# Patient Record
Sex: FEMALE | Race: BLACK OR AFRICAN AMERICAN | Employment: OTHER | URBAN - METROPOLITAN AREA
[De-identification: names, ages, dates, MRNs, and addresses within clinical notes are randomized per-mention and may not be internally consistent; named-entity substitution may affect disease eponyms.]

---

## 2017-02-02 ENCOUNTER — OFFICE VISIT (OUTPATIENT)
Dept: PRIMARY CARE CLINIC | Age: 72
End: 2017-02-02

## 2017-02-02 VITALS
TEMPERATURE: 97.2 F | BODY MASS INDEX: 26.07 KG/M2 | DIASTOLIC BLOOD PRESSURE: 78 MMHG | WEIGHT: 164 LBS | OXYGEN SATURATION: 100 % | HEART RATE: 73 BPM | SYSTOLIC BLOOD PRESSURE: 110 MMHG

## 2017-02-02 DIAGNOSIS — E55.9 VITAMIN D DEFICIENCY: ICD-10-CM

## 2017-02-02 DIAGNOSIS — K30 INDIGESTION: ICD-10-CM

## 2017-02-02 DIAGNOSIS — E78.2 MIXED HYPERLIPIDEMIA: ICD-10-CM

## 2017-02-02 DIAGNOSIS — Z12.11 COLON CANCER SCREENING: ICD-10-CM

## 2017-02-02 DIAGNOSIS — I10 ESSENTIAL HYPERTENSION: Primary | ICD-10-CM

## 2017-02-02 PROCEDURE — 3288F FALL RISK ASSESSMENT DOCD: CPT | Performed by: INTERNAL MEDICINE

## 2017-02-02 PROCEDURE — 99213 OFFICE O/P EST LOW 20 MIN: CPT | Performed by: INTERNAL MEDICINE

## 2017-02-02 PROCEDURE — G8510 SCR DEP NEG, NO PLAN REQD: HCPCS | Performed by: INTERNAL MEDICINE

## 2017-02-02 RX ORDER — RANITIDINE 150 MG/1
150 TABLET ORAL 2 TIMES DAILY
Qty: 60 TABLET | Refills: 3 | Status: SHIPPED | OUTPATIENT
Start: 2017-02-02 | End: 2017-08-07 | Stop reason: ALTCHOICE

## 2017-02-05 ASSESSMENT — ENCOUNTER SYMPTOMS
CHEST TIGHTNESS: 0
NAUSEA: 0
ABDOMINAL PAIN: 0
STRIDOR: 0
SORE THROAT: 0
BLOOD IN STOOL: 0
VOICE CHANGE: 0
BACK PAIN: 0
ABDOMINAL DISTENTION: 0
RECTAL PAIN: 0
CHOKING: 0
RHINORRHEA: 0
ANAL BLEEDING: 0
SINUS PRESSURE: 0
SHORTNESS OF BREATH: 0
VOMITING: 0
APNEA: 0
CONSTIPATION: 0
WHEEZING: 0
TROUBLE SWALLOWING: 0
DIARRHEA: 0
FACIAL SWELLING: 0
EYES NEGATIVE: 1
COUGH: 0

## 2017-02-05 ASSESSMENT — PATIENT HEALTH QUESTIONNAIRE - PHQ9
1. LITTLE INTEREST OR PLEASURE IN DOING THINGS: 0
SUM OF ALL RESPONSES TO PHQ QUESTIONS 1-9: 0
2. FEELING DOWN, DEPRESSED OR HOPELESS: 0
SUM OF ALL RESPONSES TO PHQ9 QUESTIONS 1 & 2: 0

## 2017-02-10 ENCOUNTER — HOSPITAL ENCOUNTER (OUTPATIENT)
Dept: OTHER | Age: 72
Discharge: HOME | End: 2017-02-10
Attending: INTERNAL MEDICINE | Admitting: INTERNAL MEDICINE

## 2017-02-10 LAB
ALBUMIN SERPL-MCNC: 4.2 G/DL (ref 3.4–5)
ANION GAP SERPL CALCULATED.3IONS-SCNC: 13 MMOL/L (ref 3–16)
BUN BLDV-MCNC: 12 MG/DL (ref 7–20)
CALCIUM SERPL-MCNC: 9.6 MG/DL (ref 8.3–10.6)
CHLORIDE BLD-SCNC: 100 MMOL/L (ref 99–110)
CHOLESTEROL, TOTAL: 243 MG/DL (ref 0–199)
CO2: 27 MMOL/L (ref 21–32)
CREAT SERPL-MCNC: 0.7 MG/DL (ref 0.6–1.2)
GFR AFRICAN AMERICAN: >60
GFR NON-AFRICAN AMERICAN: >60
GLUCOSE BLD-MCNC: 84 MG/DL (ref 70–99)
HDLC SERPL-MCNC: 83 MG/DL (ref 40–60)
LDL CHOLESTEROL CALCULATED: 152 MG/DL
PHOSPHORUS: 3.3 MG/DL (ref 2.5–4.9)
POTASSIUM SERPL-SCNC: 4.3 MMOL/L (ref 3.5–5.1)
SODIUM BLD-SCNC: 140 MMOL/L (ref 136–145)
TRIGL SERPL-MCNC: 42 MG/DL (ref 0–150)
VITAMIN D 25-HYDROXY: 30.5 NG/ML
VLDLC SERPL CALC-MCNC: 8 MG/DL

## 2017-02-12 LAB — H PYLORI BREATH TEST: NEGATIVE

## 2017-02-28 ENCOUNTER — TELEPHONE (OUTPATIENT)
Dept: FAMILY MEDICINE CLINIC | Age: 72
End: 2017-02-28

## 2017-08-07 ENCOUNTER — OFFICE VISIT (OUTPATIENT)
Dept: PRIMARY CARE CLINIC | Age: 72
End: 2017-08-07

## 2017-08-07 VITALS
SYSTOLIC BLOOD PRESSURE: 118 MMHG | WEIGHT: 161 LBS | DIASTOLIC BLOOD PRESSURE: 84 MMHG | HEART RATE: 56 BPM | BODY MASS INDEX: 25.88 KG/M2 | OXYGEN SATURATION: 100 % | TEMPERATURE: 98.6 F | HEIGHT: 66 IN

## 2017-08-07 DIAGNOSIS — I10 ESSENTIAL HYPERTENSION: ICD-10-CM

## 2017-08-07 DIAGNOSIS — Z11.59 NEED FOR HEPATITIS C SCREENING TEST: ICD-10-CM

## 2017-08-07 DIAGNOSIS — Z23 NEED FOR PROPHYLACTIC VACCINATION AGAINST STREPTOCOCCUS PNEUMONIAE (PNEUMOCOCCUS): ICD-10-CM

## 2017-08-07 DIAGNOSIS — Z23 NEED FOR PROPHYLACTIC VACCINATION AGAINST DIPHTHERIA-TETANUS-PERTUSSIS (DTP): ICD-10-CM

## 2017-08-07 DIAGNOSIS — N39.0 URINARY TRACT INFECTION WITHOUT HEMATURIA, SITE UNSPECIFIED: Primary | ICD-10-CM

## 2017-08-07 LAB
A/G RATIO: 1.6 (ref 1.1–2.2)
ALBUMIN SERPL-MCNC: 4.9 G/DL (ref 3.4–5)
ALP BLD-CCNC: 145 U/L (ref 40–129)
ALT SERPL-CCNC: 12 U/L (ref 10–40)
ANION GAP SERPL CALCULATED.3IONS-SCNC: 14 MMOL/L (ref 3–16)
AST SERPL-CCNC: 17 U/L (ref 15–37)
BASOPHILS ABSOLUTE: 0 K/UL (ref 0–0.2)
BASOPHILS RELATIVE PERCENT: 0.9 %
BILIRUB SERPL-MCNC: 0.5 MG/DL (ref 0–1)
BILIRUBIN URINE: NEGATIVE
BLOOD, URINE: NEGATIVE
BUN BLDV-MCNC: 10 MG/DL (ref 7–20)
CALCIUM SERPL-MCNC: 10.3 MG/DL (ref 8.3–10.6)
CHLORIDE BLD-SCNC: 100 MMOL/L (ref 99–110)
CLARITY: CLEAR
CO2: 28 MMOL/L (ref 21–32)
COLOR: YELLOW
CREAT SERPL-MCNC: 0.6 MG/DL (ref 0.6–1.2)
EOSINOPHILS ABSOLUTE: 0 K/UL (ref 0–0.6)
EOSINOPHILS RELATIVE PERCENT: 0.5 %
EPITHELIAL CELLS, UA: 2 /HPF (ref 0–5)
GFR AFRICAN AMERICAN: >60
GFR NON-AFRICAN AMERICAN: >60
GLOBULIN: 3 G/DL
GLUCOSE BLD-MCNC: 96 MG/DL (ref 70–99)
GLUCOSE URINE: NEGATIVE MG/DL
HCT VFR BLD CALC: 43.8 % (ref 36–48)
HEMOGLOBIN: 14.1 G/DL (ref 12–16)
HYALINE CASTS: 1 /LPF (ref 0–8)
KETONES, URINE: ABNORMAL MG/DL
LEUKOCYTE ESTERASE, URINE: ABNORMAL
LYMPHOCYTES ABSOLUTE: 1.3 K/UL (ref 1–5.1)
LYMPHOCYTES RELATIVE PERCENT: 42.1 %
MCH RBC QN AUTO: 28.7 PG (ref 26–34)
MCHC RBC AUTO-ENTMCNC: 32.2 G/DL (ref 31–36)
MCV RBC AUTO: 89.1 FL (ref 80–100)
MICROSCOPIC EXAMINATION: YES
MONOCYTES ABSOLUTE: 0.4 K/UL (ref 0–1.3)
MONOCYTES RELATIVE PERCENT: 12 %
NEUTROPHILS ABSOLUTE: 1.4 K/UL (ref 1.7–7.7)
NEUTROPHILS RELATIVE PERCENT: 44.5 %
NITRITE, URINE: NEGATIVE
PDW BLD-RTO: 13.4 % (ref 12.4–15.4)
PH UA: 6
PLATELET # BLD: 197 K/UL (ref 135–450)
PMV BLD AUTO: 8.7 FL (ref 5–10.5)
POTASSIUM SERPL-SCNC: 5.4 MMOL/L (ref 3.5–5.1)
PROTEIN UA: NEGATIVE MG/DL
RBC # BLD: 4.91 M/UL (ref 4–5.2)
RBC UA: 4 /HPF (ref 0–4)
SODIUM BLD-SCNC: 142 MMOL/L (ref 136–145)
SPECIFIC GRAVITY UA: 1.01
TOTAL PROTEIN: 7.9 G/DL (ref 6.4–8.2)
URINE TYPE: ABNORMAL
UROBILINOGEN, URINE: 0.2 E.U./DL
WBC # BLD: 3.1 K/UL (ref 4–11)
WBC UA: 9 /HPF (ref 0–5)

## 2017-08-07 PROCEDURE — 99213 OFFICE O/P EST LOW 20 MIN: CPT | Performed by: INTERNAL MEDICINE

## 2017-08-07 RX ORDER — IRBESARTAN AND HYDROCHLOROTHIAZIDE 300; 12.5 MG/1; MG/1
1 TABLET, FILM COATED ORAL DAILY
Qty: 30 TABLET | Refills: 3 | Status: SHIPPED | OUTPATIENT
Start: 2017-08-07 | End: 2017-11-08

## 2017-08-07 RX ORDER — SULFAMETHOXAZOLE AND TRIMETHOPRIM 800; 160 MG/1; MG/1
1 TABLET ORAL DAILY
Qty: 7 TABLET | Refills: 0 | Status: SHIPPED | OUTPATIENT
Start: 2017-08-07 | End: 2017-08-14

## 2017-08-07 ASSESSMENT — PATIENT HEALTH QUESTIONNAIRE - PHQ9
SUM OF ALL RESPONSES TO PHQ QUESTIONS 1-9: 0
SUM OF ALL RESPONSES TO PHQ9 QUESTIONS 1 & 2: 0
1. LITTLE INTEREST OR PLEASURE IN DOING THINGS: 0
2. FEELING DOWN, DEPRESSED OR HOPELESS: 0

## 2017-08-08 ENCOUNTER — TELEPHONE (OUTPATIENT)
Dept: PRIMARY CARE CLINIC | Age: 72
End: 2017-08-08

## 2017-08-08 LAB — HEPATITIS C ANTIBODY INTERPRETATION: NORMAL

## 2017-08-09 ASSESSMENT — ENCOUNTER SYMPTOMS
SHORTNESS OF BREATH: 0
ANAL BLEEDING: 0
COUGH: 0
APNEA: 0
RHINORRHEA: 0
DIARRHEA: 0
ABDOMINAL PAIN: 0
STRIDOR: 0
VOMITING: 0
ABDOMINAL DISTENTION: 0
CONSTIPATION: 0
BLOOD IN STOOL: 0
RECTAL PAIN: 0
SORE THROAT: 0
CHOKING: 0
EYES NEGATIVE: 1
NAUSEA: 0
WHEEZING: 0
TROUBLE SWALLOWING: 0
SINUS PRESSURE: 0
CHEST TIGHTNESS: 0
VOICE CHANGE: 0
FACIAL SWELLING: 0
BACK PAIN: 0

## 2017-11-08 ENCOUNTER — OFFICE VISIT (OUTPATIENT)
Dept: PRIMARY CARE CLINIC | Age: 72
End: 2017-11-08

## 2017-11-08 VITALS
TEMPERATURE: 98.9 F | BODY MASS INDEX: 25.02 KG/M2 | HEART RATE: 58 BPM | RESPIRATION RATE: 18 BRPM | SYSTOLIC BLOOD PRESSURE: 106 MMHG | OXYGEN SATURATION: 98 % | WEIGHT: 155 LBS | DIASTOLIC BLOOD PRESSURE: 68 MMHG

## 2017-11-08 DIAGNOSIS — I10 ESSENTIAL HYPERTENSION: ICD-10-CM

## 2017-11-08 DIAGNOSIS — I10 ESSENTIAL HYPERTENSION: Primary | ICD-10-CM

## 2017-11-08 LAB
ALBUMIN SERPL-MCNC: 4.5 G/DL (ref 3.4–5)
ANION GAP SERPL CALCULATED.3IONS-SCNC: 11 MMOL/L (ref 3–16)
BUN BLDV-MCNC: 12 MG/DL (ref 7–20)
CALCIUM SERPL-MCNC: 10 MG/DL (ref 8.3–10.6)
CHLORIDE BLD-SCNC: 101 MMOL/L (ref 99–110)
CO2: 29 MMOL/L (ref 21–32)
CREAT SERPL-MCNC: 0.6 MG/DL (ref 0.6–1.2)
GFR AFRICAN AMERICAN: >60
GFR NON-AFRICAN AMERICAN: >60
GLUCOSE BLD-MCNC: 90 MG/DL (ref 70–99)
PHOSPHORUS: 3.9 MG/DL (ref 2.5–4.9)
POTASSIUM SERPL-SCNC: 4.5 MMOL/L (ref 3.5–5.1)
SODIUM BLD-SCNC: 141 MMOL/L (ref 136–145)

## 2017-11-08 PROCEDURE — 99213 OFFICE O/P EST LOW 20 MIN: CPT | Performed by: INTERNAL MEDICINE

## 2017-11-08 RX ORDER — IRBESARTAN AND HYDROCHLOROTHIAZIDE 150; 12.5 MG/1; MG/1
1 TABLET, FILM COATED ORAL DAILY
Qty: 90 TABLET | Refills: 3 | Status: SHIPPED | OUTPATIENT
Start: 2017-11-08 | End: 2018-05-09 | Stop reason: SDUPTHER

## 2017-11-08 ASSESSMENT — PATIENT HEALTH QUESTIONNAIRE - PHQ9
2. FEELING DOWN, DEPRESSED OR HOPELESS: 0
SUM OF ALL RESPONSES TO PHQ QUESTIONS 1-9: 0
1. LITTLE INTEREST OR PLEASURE IN DOING THINGS: 0
SUM OF ALL RESPONSES TO PHQ9 QUESTIONS 1 & 2: 0

## 2017-11-08 NOTE — PROGRESS NOTES
Subjective:      Patient ID: Caitlin Webber is a 67 y.o. female. Hypertension   This is a chronic problem. The current episode started more than 1 year ago. The problem is unchanged. The problem is controlled. Pertinent negatives include no anxiety, blurred vision, chest pain, headaches, malaise/fatigue, neck pain, orthopnea, palpitations, peripheral edema, PND, shortness of breath or sweats. There are no associated agents to hypertension. Past treatments include angiotensin blockers and diuretics. The current treatment provides significant improvement. There are no compliance problems. There is no history of angina, kidney disease, CVA, PVD or retinopathy.     ]  Review of Systems   Constitutional: Negative. Negative for malaise/fatigue. HENT: Negative. Eyes: Negative. Negative for blurred vision. Respiratory: Negative. Negative for shortness of breath. Cardiovascular: Negative. Negative for chest pain, palpitations, orthopnea and PND. Gastrointestinal: Negative. Endocrine: Negative. Genitourinary: Negative. Musculoskeletal: Negative. Negative for neck pain. Neurological: Negative for headaches. Objective:   Physical Exam   Constitutional: She is oriented to person, place, and time. She appears well-developed and well-nourished. No distress. HENT:   Head: Normocephalic and atraumatic. Right Ear: External ear normal.   Left Ear: External ear normal.   Mouth/Throat: Oropharynx is clear and moist. No oropharyngeal exudate. Eyes: Conjunctivae and EOM are normal. Pupils are equal, round, and reactive to light. Right eye exhibits no discharge. Left eye exhibits no discharge. No scleral icterus. Neck: Normal range of motion. Neck supple. No JVD present. No tracheal deviation present. No thyromegaly present. Cardiovascular: Normal rate, normal heart sounds and intact distal pulses. Pulmonary/Chest: Effort normal and breath sounds normal. No respiratory distress.  She has no wheezes. She has no rales. She exhibits no tenderness. Abdominal: Soft. Bowel sounds are normal. She exhibits no distension and no mass. There is no tenderness. Musculoskeletal: She exhibits no edema or tenderness. Pain in left hip with rom and rom reduced 40 percent. No warmth or redness. Lymphadenopathy:     She has no cervical adenopathy. Neurological: She is alert and oriented to person, place, and time. No cranial nerve deficit. She exhibits normal muscle tone. Coordination normal.   Decreased sensation of smell. Skin: Skin is warm and dry. No rash noted. She is not diaphoretic. Psychiatric: She has a normal mood and affect. Her behavior is normal. Judgment and thought content normal.   Vitals reviewed. Assessment:     1. Essential hypertension is controlled. Continue meds and diet. irbesartan-hydrochlorothiazide (AVALIDE) 150-12.5 MG per tablet    CBC Auto Differential    RENAL FUNCTION PANEL           Plan: Carolyne received counseling on the following healthy behaviors: medication adherence    Patient given educational materials on After visit summary with diagnosis and treatment plan. I have instructed [facility] to complete a self tracking handout on Blood Pressures  and Weights and instructed them to bring it with them to her next appointment. Discussed use, benefit, and side effects of prescribed medications. Barriers to medication compliance addressed. All patient questions answered. Pt voiced understanding. Patient is taking over the counter meds and discussed as to how they interact with prescription medications. Return in three months.

## 2017-11-09 LAB
BASOPHILS ABSOLUTE: 0 K/UL (ref 0–0.2)
BASOPHILS RELATIVE PERCENT: 1 %
EOSINOPHILS ABSOLUTE: 0.2 K/UL (ref 0–0.6)
EOSINOPHILS RELATIVE PERCENT: 7 %
HCT VFR BLD CALC: 43.5 % (ref 36–48)
HEMATOLOGY PATH CONSULT: NO
HEMOGLOBIN: 13.9 G/DL (ref 12–16)
LYMPHOCYTES ABSOLUTE: 1.3 K/UL (ref 1–5.1)
LYMPHOCYTES RELATIVE PERCENT: 50 %
MCH RBC QN AUTO: 28.8 PG (ref 26–34)
MCHC RBC AUTO-ENTMCNC: 32 G/DL (ref 31–36)
MCV RBC AUTO: 90 FL (ref 80–100)
MONOCYTES ABSOLUTE: 0.3 K/UL (ref 0–1.3)
MONOCYTES RELATIVE PERCENT: 11 %
NEUTROPHILS ABSOLUTE: 0.8 K/UL (ref 1.7–7.7)
NEUTROPHILS RELATIVE PERCENT: 31 %
PDW BLD-RTO: 13.3 % (ref 12.4–15.4)
PLATELET # BLD: 170 K/UL (ref 135–450)
PMV BLD AUTO: 8.6 FL (ref 5–10.5)
RBC # BLD: 4.83 M/UL (ref 4–5.2)
WBC # BLD: 2.5 K/UL (ref 4–11)

## 2017-11-11 DIAGNOSIS — D70.8 OTHER NEUTROPENIA (HCC): Primary | ICD-10-CM

## 2017-11-19 ASSESSMENT — ENCOUNTER SYMPTOMS
ORTHOPNEA: 0
BLURRED VISION: 0
RESPIRATORY NEGATIVE: 1
EYES NEGATIVE: 1
GASTROINTESTINAL NEGATIVE: 1
SHORTNESS OF BREATH: 0

## 2018-05-09 ENCOUNTER — OFFICE VISIT (OUTPATIENT)
Dept: PRIMARY CARE CLINIC | Age: 73
End: 2018-05-09

## 2018-05-09 VITALS
BODY MASS INDEX: 25.34 KG/M2 | SYSTOLIC BLOOD PRESSURE: 111 MMHG | RESPIRATION RATE: 18 BRPM | DIASTOLIC BLOOD PRESSURE: 71 MMHG | TEMPERATURE: 97.5 F | OXYGEN SATURATION: 97 % | WEIGHT: 157 LBS | HEART RATE: 68 BPM

## 2018-05-09 DIAGNOSIS — I10 ESSENTIAL HYPERTENSION: ICD-10-CM

## 2018-05-09 DIAGNOSIS — E78.2 MIXED HYPERLIPIDEMIA: ICD-10-CM

## 2018-05-09 DIAGNOSIS — I10 ESSENTIAL HYPERTENSION: Primary | ICD-10-CM

## 2018-05-09 DIAGNOSIS — M79.2 RADICULAR PAIN IN RIGHT ARM: ICD-10-CM

## 2018-05-09 LAB
ALBUMIN SERPL-MCNC: 4.6 G/DL (ref 3.4–5)
ANION GAP SERPL CALCULATED.3IONS-SCNC: 15 MMOL/L (ref 3–16)
BUN BLDV-MCNC: 14 MG/DL (ref 7–20)
CALCIUM SERPL-MCNC: 9.7 MG/DL (ref 8.3–10.6)
CHLORIDE BLD-SCNC: 100 MMOL/L (ref 99–110)
CHOLESTEROL, TOTAL: 230 MG/DL (ref 0–199)
CO2: 27 MMOL/L (ref 21–32)
CREAT SERPL-MCNC: 0.7 MG/DL (ref 0.6–1.2)
GFR AFRICAN AMERICAN: >60
GFR NON-AFRICAN AMERICAN: >60
GLUCOSE BLD-MCNC: 88 MG/DL (ref 70–99)
HDLC SERPL-MCNC: 90 MG/DL (ref 40–60)
LDL CHOLESTEROL CALCULATED: 133 MG/DL
PHOSPHORUS: 3.9 MG/DL (ref 2.5–4.9)
POTASSIUM SERPL-SCNC: 4.5 MMOL/L (ref 3.5–5.1)
SODIUM BLD-SCNC: 142 MMOL/L (ref 136–145)
TRIGL SERPL-MCNC: 36 MG/DL (ref 0–150)
VLDLC SERPL CALC-MCNC: 7 MG/DL

## 2018-05-09 PROCEDURE — 99213 OFFICE O/P EST LOW 20 MIN: CPT | Performed by: INTERNAL MEDICINE

## 2018-05-09 RX ORDER — IRBESARTAN AND HYDROCHLOROTHIAZIDE 150; 12.5 MG/1; MG/1
1 TABLET, FILM COATED ORAL DAILY
Qty: 90 TABLET | Refills: 3 | Status: SHIPPED | OUTPATIENT
Start: 2018-05-09 | End: 2018-09-28 | Stop reason: SDUPTHER

## 2018-05-09 ASSESSMENT — ENCOUNTER SYMPTOMS
ABDOMINAL DISTENTION: 0
NAUSEA: 0
VOICE CHANGE: 0
RECTAL PAIN: 0
DIARRHEA: 0
CHOKING: 0
TROUBLE SWALLOWING: 0
RHINORRHEA: 0
APNEA: 0
SHORTNESS OF BREATH: 0
COUGH: 0
SORE THROAT: 0
BLOOD IN STOOL: 0
VOMITING: 0
BACK PAIN: 0
ANAL BLEEDING: 0
FACIAL SWELLING: 0
SINUS PRESSURE: 0
ABDOMINAL PAIN: 0
CONSTIPATION: 0
EYES NEGATIVE: 1
CHEST TIGHTNESS: 0
STRIDOR: 0
WHEEZING: 0

## 2018-05-09 ASSESSMENT — PATIENT HEALTH QUESTIONNAIRE - PHQ9
2. FEELING DOWN, DEPRESSED OR HOPELESS: 0
SUM OF ALL RESPONSES TO PHQ QUESTIONS 1-9: 0
SUM OF ALL RESPONSES TO PHQ9 QUESTIONS 1 & 2: 0
1. LITTLE INTEREST OR PLEASURE IN DOING THINGS: 0

## 2018-05-10 ENCOUNTER — TELEPHONE (OUTPATIENT)
Dept: PRIMARY CARE CLINIC | Age: 73
End: 2018-05-10

## 2018-05-10 LAB
BASOPHILS ABSOLUTE: 0 K/UL (ref 0–0.2)
BASOPHILS RELATIVE PERCENT: 1.1 %
EOSINOPHILS ABSOLUTE: 0 K/UL (ref 0–0.6)
EOSINOPHILS RELATIVE PERCENT: 0.8 %
HCT VFR BLD CALC: 42.6 % (ref 36–48)
HEMATOLOGY PATH CONSULT: NO
HEMOGLOBIN: 13.8 G/DL (ref 12–16)
LYMPHOCYTES ABSOLUTE: 1.2 K/UL (ref 1–5.1)
LYMPHOCYTES RELATIVE PERCENT: 57.2 %
MCH RBC QN AUTO: 29.1 PG (ref 26–34)
MCHC RBC AUTO-ENTMCNC: 32.3 G/DL (ref 31–36)
MCV RBC AUTO: 90 FL (ref 80–100)
MONOCYTES ABSOLUTE: 0.2 K/UL (ref 0–1.3)
MONOCYTES RELATIVE PERCENT: 10.2 %
NEUTROPHILS ABSOLUTE: 0.7 K/UL (ref 1.7–7.7)
NEUTROPHILS RELATIVE PERCENT: 30.7 %
PDW BLD-RTO: 13.5 % (ref 12.4–15.4)
PLATELET # BLD: 208 K/UL (ref 135–450)
PMV BLD AUTO: 8.4 FL (ref 5–10.5)
RBC # BLD: 4.73 M/UL (ref 4–5.2)
WBC # BLD: 2.2 K/UL (ref 4–11)

## 2018-05-11 DIAGNOSIS — D70.3 NEUTROPENIA ASSOCIATED WITH INFECTION (HCC): Primary | ICD-10-CM

## 2018-06-05 DIAGNOSIS — D70.8 OTHER NEUTROPENIA (HCC): Primary | ICD-10-CM

## 2018-09-28 DIAGNOSIS — I10 ESSENTIAL HYPERTENSION: ICD-10-CM

## 2018-09-28 RX ORDER — IRBESARTAN AND HYDROCHLOROTHIAZIDE 150; 12.5 MG/1; MG/1
2 TABLET, FILM COATED ORAL DAILY
Qty: 90 TABLET | Refills: 3
Start: 2018-09-28 | End: 2018-10-15

## 2018-10-15 ENCOUNTER — OFFICE VISIT (OUTPATIENT)
Dept: PRIMARY CARE CLINIC | Age: 73
End: 2018-10-15
Payer: COMMERCIAL

## 2018-10-15 VITALS
SYSTOLIC BLOOD PRESSURE: 105 MMHG | DIASTOLIC BLOOD PRESSURE: 65 MMHG | OXYGEN SATURATION: 99 % | WEIGHT: 157 LBS | TEMPERATURE: 97.8 F | RESPIRATION RATE: 18 BRPM | HEART RATE: 59 BPM | BODY MASS INDEX: 25.34 KG/M2

## 2018-10-15 DIAGNOSIS — E78.2 MIXED HYPERLIPIDEMIA: ICD-10-CM

## 2018-10-15 DIAGNOSIS — D70.8 OTHER NEUTROPENIA (HCC): ICD-10-CM

## 2018-10-15 DIAGNOSIS — I10 ESSENTIAL HYPERTENSION: Primary | ICD-10-CM

## 2018-10-15 DIAGNOSIS — I10 ESSENTIAL HYPERTENSION: ICD-10-CM

## 2018-10-15 LAB
A/G RATIO: 1.6 (ref 1.1–2.2)
ALBUMIN SERPL-MCNC: 4.8 G/DL (ref 3.4–5)
ALP BLD-CCNC: 146 U/L (ref 40–129)
ALT SERPL-CCNC: 15 U/L (ref 10–40)
ANION GAP SERPL CALCULATED.3IONS-SCNC: 17 MMOL/L (ref 3–16)
AST SERPL-CCNC: 20 U/L (ref 15–37)
BILIRUB SERPL-MCNC: 0.5 MG/DL (ref 0–1)
BILIRUBIN URINE: NEGATIVE
BLOOD, URINE: NEGATIVE
BUN BLDV-MCNC: 11 MG/DL (ref 7–20)
CALCIUM SERPL-MCNC: 10.7 MG/DL (ref 8.3–10.6)
CHLORIDE BLD-SCNC: 100 MMOL/L (ref 99–110)
CLARITY: CLEAR
CO2: 25 MMOL/L (ref 21–32)
COLOR: YELLOW
CREAT SERPL-MCNC: 0.7 MG/DL (ref 0.6–1.2)
FOLATE: >20 NG/ML (ref 4.78–24.2)
GFR AFRICAN AMERICAN: >60
GFR NON-AFRICAN AMERICAN: >60
GLOBULIN: 3 G/DL
GLUCOSE BLD-MCNC: 90 MG/DL (ref 70–99)
GLUCOSE URINE: NEGATIVE MG/DL
KETONES, URINE: NEGATIVE MG/DL
LEUKOCYTE ESTERASE, URINE: NEGATIVE
MICROSCOPIC EXAMINATION: NORMAL
NITRITE, URINE: NEGATIVE
PH UA: 7
POTASSIUM SERPL-SCNC: 4.6 MMOL/L (ref 3.5–5.1)
PROTEIN UA: NEGATIVE MG/DL
SODIUM BLD-SCNC: 142 MMOL/L (ref 136–145)
SPECIFIC GRAVITY UA: 1.02
TOTAL PROTEIN: 7.8 G/DL (ref 6.4–8.2)
URINE TYPE: NORMAL
UROBILINOGEN, URINE: 0.2 E.U./DL
VITAMIN B-12: 1927 PG/ML (ref 211–911)

## 2018-10-15 PROCEDURE — 99213 OFFICE O/P EST LOW 20 MIN: CPT | Performed by: INTERNAL MEDICINE

## 2018-10-15 RX ORDER — IRBESARTAN AND HYDROCHLOROTHIAZIDE 300; 12.5 MG/1; MG/1
1 TABLET, FILM COATED ORAL DAILY
Qty: 30 TABLET | Refills: 5 | Status: SHIPPED | OUTPATIENT
Start: 2018-10-15 | End: 2019-05-22 | Stop reason: SDUPTHER

## 2018-10-15 ASSESSMENT — ENCOUNTER SYMPTOMS
STRIDOR: 0
VOICE CHANGE: 0
VOMITING: 0
TROUBLE SWALLOWING: 0
EYES NEGATIVE: 1
SINUS PRESSURE: 0
SORE THROAT: 0
NAUSEA: 0
WHEEZING: 0
CHOKING: 0
APNEA: 0
ABDOMINAL PAIN: 0
BLOOD IN STOOL: 0
RECTAL PAIN: 0
BACK PAIN: 0
CHEST TIGHTNESS: 0
ABDOMINAL DISTENTION: 0
COUGH: 0
SHORTNESS OF BREATH: 0
ANAL BLEEDING: 0
DIARRHEA: 0
FACIAL SWELLING: 0
CONSTIPATION: 0
RHINORRHEA: 0

## 2018-10-15 NOTE — PROGRESS NOTES
10/15/2018     Kaden Ayon (:  1945) is a 68 y.o. female, here for evaluation of the following medical concerns:    Hypertension   This is a chronic problem. The current episode started more than 1 year ago. The problem is unchanged. The problem is controlled. Pertinent negatives include no chest pain, headaches, neck pain, palpitations or shortness of breath. There are no associated agents to hypertension. Risk factors for coronary artery disease include dyslipidemia and post-menopausal state. Past treatments include angiotensin blockers and diuretics. The current treatment provides significant improvement. There is no history of angina, kidney disease, CVA, heart failure, left ventricular hypertrophy or retinopathy. Patient Active Problem List   Diagnosis    Stroke ([facility] [address]    Fibroids    Right lower quadrant pain    Menopause    Osteoporosis    Essential hypertension    Aseptic necrosis of bone of hip (HCC)    Mixed hyperlipidemia    Other neutropenia (HCC)     Allergies   Allergen Reactions    Ace Inhibitors Other (See Comments)     cough    Asa [Aspirin] Other (See Comments)     Large amount of bruising.  Other      Environmental: dust, mold,       Review of Systems   Constitutional: Negative for activity change, appetite change, chills, diaphoresis, fatigue, fever and unexpected weight change. HENT: Negative for congestion, dental problem, drooling, ear discharge, ear pain, facial swelling, hearing loss, mouth sores, postnasal drip, rhinorrhea, sinus pressure, sneezing, sore throat, tinnitus, trouble swallowing and voice change. Eyes: Negative. Respiratory: Negative for apnea, cough, choking, chest tightness, shortness of breath, wheezing and stridor. Cardiovascular: Negative for chest pain, palpitations and leg swelling.    Gastrointestinal: Negative for abdominal distention, abdominal pain, anal bleeding, blood in stool, constipation, diarrhea, nausea, rectal pain

## 2018-10-16 DIAGNOSIS — E83.52 HYPERCALCEMIA: Primary | ICD-10-CM

## 2018-10-16 DIAGNOSIS — R74.8 ALKALINE PHOSPHATASE ELEVATION: Primary | ICD-10-CM

## 2018-10-16 LAB
BASOPHILS ABSOLUTE: 0 K/UL (ref 0–0.2)
BASOPHILS RELATIVE PERCENT: 1.3 %
EOSINOPHILS ABSOLUTE: 0 K/UL (ref 0–0.6)
EOSINOPHILS RELATIVE PERCENT: 0.7 %
HCT VFR BLD CALC: 43.9 % (ref 36–48)
HEMATOLOGY PATH CONSULT: NO
HEMOGLOBIN: 14 G/DL (ref 12–16)
LYMPHOCYTES ABSOLUTE: 1.5 K/UL (ref 1–5.1)
LYMPHOCYTES RELATIVE PERCENT: 55 %
MCH RBC QN AUTO: 28.6 PG (ref 26–34)
MCHC RBC AUTO-ENTMCNC: 31.9 G/DL (ref 31–36)
MCV RBC AUTO: 89.5 FL (ref 80–100)
MONOCYTES ABSOLUTE: 0.2 K/UL (ref 0–1.3)
MONOCYTES RELATIVE PERCENT: 8.7 %
NEUTROPHILS ABSOLUTE: 0.9 K/UL (ref 1.7–7.7)
NEUTROPHILS RELATIVE PERCENT: 34.3 %
PARATHYROID HORMONE INTACT: 51.3 PG/ML (ref 14–72)
PDW BLD-RTO: 13.5 % (ref 12.4–15.4)
PLATELET # BLD: 218 K/UL (ref 135–450)
PMV BLD AUTO: 8.5 FL (ref 5–10.5)
RBC # BLD: 4.9 M/UL (ref 4–5.2)
WBC # BLD: 2.7 K/UL (ref 4–11)

## 2018-10-17 LAB
ALBUMIN SERPL-MCNC: 4 G/DL (ref 3.1–4.9)
ALPHA-1-GLOBULIN: 0.3 G/DL (ref 0.2–0.4)
ALPHA-2-GLOBULIN: 0.7 G/DL (ref 0.4–1.1)
BETA GLOBULIN: 1.3 G/DL (ref 0.9–1.6)
GAMMA GLOBULIN: 1.5 G/DL (ref 0.6–1.8)
SPE/IFE INTERPRETATION: NORMAL
TOTAL PROTEIN: 7.7 G/DL (ref 6.4–8.2)

## 2018-10-18 ENCOUNTER — TELEPHONE (OUTPATIENT)
Dept: PRIMARY CARE CLINIC | Age: 73
End: 2018-10-18

## 2018-10-18 LAB
REASON FOR REJECTION: NORMAL
REJECTED TEST: NORMAL

## 2019-01-28 ENCOUNTER — TELEPHONE (OUTPATIENT)
Dept: PRIMARY CARE CLINIC | Age: 74
End: 2019-01-28

## 2019-05-22 ENCOUNTER — OFFICE VISIT (OUTPATIENT)
Dept: PRIMARY CARE CLINIC | Age: 74
End: 2019-05-22
Payer: COMMERCIAL

## 2019-05-22 VITALS
HEART RATE: 61 BPM | WEIGHT: 156 LBS | RESPIRATION RATE: 18 BRPM | BODY MASS INDEX: 25.18 KG/M2 | OXYGEN SATURATION: 97 % | SYSTOLIC BLOOD PRESSURE: 120 MMHG | TEMPERATURE: 97.8 F | DIASTOLIC BLOOD PRESSURE: 75 MMHG

## 2019-05-22 DIAGNOSIS — I10 ESSENTIAL HYPERTENSION: ICD-10-CM

## 2019-05-22 DIAGNOSIS — M79.641 RIGHT HAND PAIN: Primary | ICD-10-CM

## 2019-05-22 DIAGNOSIS — E78.2 MIXED HYPERLIPIDEMIA: ICD-10-CM

## 2019-05-22 DIAGNOSIS — D70.8 OTHER NEUTROPENIA (HCC): ICD-10-CM

## 2019-05-22 PROCEDURE — 99214 OFFICE O/P EST MOD 30 MIN: CPT | Performed by: INTERNAL MEDICINE

## 2019-05-22 RX ORDER — TRAMADOL HYDROCHLORIDE 50 MG/1
50-100 TABLET ORAL
COMMUNITY
Start: 2012-03-21

## 2019-05-22 RX ORDER — IRBESARTAN AND HYDROCHLOROTHIAZIDE 300; 12.5 MG/1; MG/1
1 TABLET, FILM COATED ORAL DAILY
Qty: 90 TABLET | Refills: 3 | Status: SHIPPED | OUTPATIENT
Start: 2019-05-22 | End: 2020-08-14

## 2019-05-22 ASSESSMENT — ENCOUNTER SYMPTOMS
SHORTNESS OF BREATH: 0
ABDOMINAL DISTENTION: 0
CHOKING: 0
BLURRED VISION: 0
RHINORRHEA: 0
CHEST TIGHTNESS: 0
DIARRHEA: 0
ANAL BLEEDING: 0
APNEA: 0
RECTAL PAIN: 0
CONSTIPATION: 0
WHEEZING: 0
BLOOD IN STOOL: 0
ORTHOPNEA: 0
STRIDOR: 0
NAUSEA: 0
FACIAL SWELLING: 0
COUGH: 0
EYES NEGATIVE: 1
SORE THROAT: 0
VOMITING: 0
VOICE CHANGE: 0
BACK PAIN: 0
SINUS PRESSURE: 0
ABDOMINAL PAIN: 0
TROUBLE SWALLOWING: 0

## 2019-05-22 ASSESSMENT — PATIENT HEALTH QUESTIONNAIRE - PHQ9
SUM OF ALL RESPONSES TO PHQ QUESTIONS 1-9: 0
2. FEELING DOWN, DEPRESSED OR HOPELESS: 0
SUM OF ALL RESPONSES TO PHQ9 QUESTIONS 1 & 2: 0
SUM OF ALL RESPONSES TO PHQ QUESTIONS 1-9: 0
1. LITTLE INTEREST OR PLEASURE IN DOING THINGS: 0

## 2019-05-22 NOTE — PROGRESS NOTES
2019     Kelley Jennings (:  1945) is a 68 y.o. female, here for evaluation of the following medical concerns:    Hand Pain    The incident occurred more than 1 week ago (weeks ago pain in mcp joints of right hand. was working the cash register at [employer] ). There was no injury mechanism. The pain is present in the right hand. The quality of the pain is described as aching. The pain does not radiate. The pain is at a severity of 5/10. The pain is moderate. The pain has been intermittent since the incident. Associated symptoms include muscle weakness. Pertinent negatives include no chest pain or numbness. Associated symptoms comments: Weakness with opening jars. . Nothing aggravates the symptoms. She has tried nothing for the symptoms. The treatment provided no relief. Hypertension   This is a chronic problem. The current episode started more than 1 year ago. The problem is unchanged. The problem is controlled. Pertinent negatives include no anxiety, blurred vision, chest pain, headaches, malaise/fatigue, neck pain, orthopnea, palpitations, peripheral edema, PND, shortness of breath or sweats. There are no associated agents to hypertension. Past treatments include angiotensin blockers and diuretics. The current treatment provides significant improvement. There are no compliance problems. There is no history of angina, kidney disease, CAD/MI, CVA, heart failure, left ventricular hypertrophy, PVD or retinopathy. There is no history of chronic renal disease. Hyperlipidemia   This is a chronic problem. The current episode started more than 1 year ago. The problem is uncontrolled. She has no history of chronic renal disease, diabetes, hypothyroidism, liver disease, obesity or nephrotic syndrome. There are no known factors aggravating her hyperlipidemia. Pertinent negatives include no chest pain, myalgias or shortness of breath.  Current antihyperlipidemic treatment includes diet change and oropharyngeal exudate. Eyes: Pupils are equal, round, and reactive to light. Conjunctivae and EOM are normal. Right eye exhibits no discharge. Left eye exhibits no discharge. No scleral icterus. Neck: Normal range of motion. Neck supple. No JVD present. No tracheal deviation present. No thyromegaly present. Cardiovascular: Normal rate, normal heart sounds and intact distal pulses. Pulmonary/Chest: Effort normal and breath sounds normal. No respiratory distress. She has no wheezes. She has no rales. She exhibits no tenderness. Abdominal: Soft. Bowel sounds are normal. She exhibits no distension and no mass. There is no tenderness. Musculoskeletal: She exhibits no edema or tenderness. Lymphadenopathy:     She has no cervical adenopathy. Neurological: She is alert and oriented to person, place, and time. She has normal reflexes. No cranial nerve deficit. She exhibits normal muscle tone. Coordination normal.   Sensation intact in arms and legs. Skin: Skin is warm and dry. No rash noted. She is not diaphoretic. Psychiatric: She has a normal mood and affect. Her behavior is normal. Judgment and thought content normal.   Vitals reviewed. Refusing vaccine. ASSESSMENT/PLAN:   Diagnosis Orders   1. Right hand pain  With normal exam.   Will xray and start. Nutritional Supplements for Arthritis  Krill oil daily  If allergic to fish , then Flax Seed Oil 1,000 mg three times daily. Tumeric 500 mg daily with dinner seasoned with black pepper. Black pepper improves absorption of tumeric. Black Current Seed Oil, 500 mg daily. Like Vitamin E, these supplement are mild blood thinner, so stop them one week prior to any surgical procedure. XR HAND RIGHT (2 VIEWS)   2. Other neutropenia (HCC) stable with no adenopathy or fatigue or sweats. CBC Auto Differential   3. Mixed hyperlipidemia  Need lipid on diet     4. Essential hypertension controlled, monitor lytes and renal function.

## 2019-05-23 ENCOUNTER — TELEPHONE (OUTPATIENT)
Dept: PRIMARY CARE CLINIC | Age: 74
End: 2019-05-23

## 2019-05-23 DIAGNOSIS — D70.8 OTHER NEUTROPENIA (HCC): Primary | ICD-10-CM

## 2019-05-23 LAB
ALBUMIN SERPL-MCNC: 4.6 G/DL (ref 3.4–5)
ANION GAP SERPL CALCULATED.3IONS-SCNC: 12 MMOL/L (ref 3–16)
BASOPHILS ABSOLUTE: 0 K/UL (ref 0–0.2)
BASOPHILS RELATIVE PERCENT: 0.7 %
BUN BLDV-MCNC: 11 MG/DL (ref 7–20)
CALCIUM SERPL-MCNC: 9.9 MG/DL (ref 8.3–10.6)
CHLORIDE BLD-SCNC: 104 MMOL/L (ref 99–110)
CHOLESTEROL, TOTAL: 230 MG/DL (ref 0–199)
CO2: 27 MMOL/L (ref 21–32)
CREAT SERPL-MCNC: 0.8 MG/DL (ref 0.6–1.2)
EOSINOPHILS ABSOLUTE: 0 K/UL (ref 0–0.6)
EOSINOPHILS RELATIVE PERCENT: 0.9 %
GFR AFRICAN AMERICAN: >60
GFR NON-AFRICAN AMERICAN: >60
GLUCOSE BLD-MCNC: 95 MG/DL (ref 70–99)
HCT VFR BLD CALC: 41.4 % (ref 36–48)
HDLC SERPL-MCNC: 82 MG/DL (ref 40–60)
HEMATOLOGY PATH CONSULT: NO
HEMOGLOBIN: 13.7 G/DL (ref 12–16)
LDL CHOLESTEROL CALCULATED: 139 MG/DL
LYMPHOCYTES ABSOLUTE: 1.3 K/UL (ref 1–5.1)
LYMPHOCYTES RELATIVE PERCENT: 55.6 %
MCH RBC QN AUTO: 29.6 PG (ref 26–34)
MCHC RBC AUTO-ENTMCNC: 33.1 G/DL (ref 31–36)
MCV RBC AUTO: 89.5 FL (ref 80–100)
MONOCYTES ABSOLUTE: 0.3 K/UL (ref 0–1.3)
MONOCYTES RELATIVE PERCENT: 11.7 %
NEUTROPHILS ABSOLUTE: 0.8 K/UL (ref 1.7–7.7)
NEUTROPHILS RELATIVE PERCENT: 31.1 %
PDW BLD-RTO: 13.1 % (ref 12.4–15.4)
PHOSPHORUS: 3.5 MG/DL (ref 2.5–4.9)
PLATELET # BLD: 200 K/UL (ref 135–450)
PMV BLD AUTO: 8.4 FL (ref 5–10.5)
POTASSIUM SERPL-SCNC: 4.3 MMOL/L (ref 3.5–5.1)
RBC # BLD: 4.63 M/UL (ref 4–5.2)
SODIUM BLD-SCNC: 143 MMOL/L (ref 136–145)
TRIGL SERPL-MCNC: 47 MG/DL (ref 0–150)
VLDLC SERPL CALC-MCNC: 9 MG/DL
WBC # BLD: 2.4 K/UL (ref 4–11)

## 2019-05-23 NOTE — TELEPHONE ENCOUNTER
Hailee with [facility] labs called to let Dr. Tonya Prescott know of [facility] lab     Abs neutrophils 0.8

## 2019-05-23 NOTE — TELEPHONE ENCOUNTER
Upon review of chart, I see that this is not new, and is consistent with past values. Have Dr Elizabeth Hare follow this up tomorrow.

## 2019-06-21 ENCOUNTER — TELEPHONE (OUTPATIENT)
Dept: PRIMARY CARE CLINIC | Age: 74
End: 2019-06-21

## 2019-06-21 DIAGNOSIS — Z12.31 OTHER SCREENING MAMMOGRAM: Primary | ICD-10-CM

## 2019-08-21 ENCOUNTER — OFFICE VISIT (OUTPATIENT)
Dept: PRIMARY CARE CLINIC | Age: 74
End: 2019-08-21
Payer: COMMERCIAL

## 2019-08-21 VITALS
TEMPERATURE: 98 F | RESPIRATION RATE: 18 BRPM | HEART RATE: 80 BPM | HEIGHT: 66 IN | SYSTOLIC BLOOD PRESSURE: 137 MMHG | DIASTOLIC BLOOD PRESSURE: 80 MMHG | OXYGEN SATURATION: 98 % | WEIGHT: 162 LBS | BODY MASS INDEX: 26.03 KG/M2

## 2019-08-21 DIAGNOSIS — Z00.00 ROUTINE GENERAL MEDICAL EXAMINATION AT A HEALTH CARE FACILITY: ICD-10-CM

## 2019-08-21 DIAGNOSIS — L65.9 HAIR THINNING: Primary | ICD-10-CM

## 2019-08-21 DIAGNOSIS — R43.0 LOSS OF SMELL: ICD-10-CM

## 2019-08-21 DIAGNOSIS — R43.2 LOSS OF TASTE: ICD-10-CM

## 2019-08-21 DIAGNOSIS — Z23 NEED FOR PNEUMOCOCCAL VACCINATION: ICD-10-CM

## 2019-08-21 DIAGNOSIS — D70.8 OTHER NEUTROPENIA (HCC): ICD-10-CM

## 2019-08-21 PROCEDURE — 90670 PCV13 VACCINE IM: CPT | Performed by: INTERNAL MEDICINE

## 2019-08-21 PROCEDURE — G0438 PPPS, INITIAL VISIT: HCPCS | Performed by: INTERNAL MEDICINE

## 2019-08-21 PROCEDURE — G0009 ADMIN PNEUMOCOCCAL VACCINE: HCPCS | Performed by: INTERNAL MEDICINE

## 2019-08-21 ASSESSMENT — PATIENT HEALTH QUESTIONNAIRE - PHQ9
SUM OF ALL RESPONSES TO PHQ QUESTIONS 1-9: 0
SUM OF ALL RESPONSES TO PHQ QUESTIONS 1-9: 0

## 2019-08-21 ASSESSMENT — LIFESTYLE VARIABLES: HOW OFTEN DO YOU HAVE A DRINK CONTAINING ALCOHOL: 0

## 2019-08-21 NOTE — PROGRESS NOTES
[facility] (Including outside providers/suppliers regularly involved in providing care):   Patient Care Team:  John Vanegas MD as PCP - General (Internal Medicine)  John Vanegas MD as PCP - [facility] Provider  Jae Summers MD as Consulting Physician (Gastroenterology)    Wt Readings from Last 3 Encounters:   08/21/19 162 lb (73.5 kg)   05/22/19 156 lb (70.8 kg)   10/15/18 157 lb (71.2 kg)     Vitals:    08/21/19 1242   BP: 137/80   Pulse: 80   Resp: 18   Temp: 98 °F (36.7 °C)   TempSrc: Oral   SpO2: 98%   Weight: 162 lb (73.5 kg)   Height: 5' 6\" (1.676 m)     Body mass index is 26.15 kg/m². Based upon direct observation of the patient, evaluation of cognition reveals recent and remote memory intact.     General Appearance: alert and oriented to person, place and time, well developed and well- nourished, in no acute distress  Skin: warm and dry, no rash or erythema  Head: normocephalic and atraumatic  Eyes: pupils equal, round, and reactive to light, extraocular eye movements intact, conjunctivae normal  ENT: tympanic membrane, external ear and ear canal normal bilaterally, nose without deformity, nasal mucosa and turbinates normal without polyps  Neck: supple and non-tender without mass, no thyromegaly or thyroid nodules, no cervical lymphadenopathy  Pulmonary/Chest: clear to auscultation bilaterally- no wheezes, rales or rhonchi, normal air movement, no respiratory distress  Cardiovascular: normal rate, regular rhythm, normal S1 and S2, no murmurs, rubs, clicks, or gallops, distal pulses intact, no carotid bruits  Abdomen: soft, non-tender, non-distended, normal bowel sounds, no masses or organomegaly  Extremities: no cyanosis, clubbing or edema  Musculoskeletal: normal range of motion, no joint swelling, deformity or tenderness  Neurologic: reflexes normal and symmetric, no cranial nerve deficit, gait, coordination and speech normal    Patient's complete Health Risk

## 2019-08-22 DIAGNOSIS — D70.8 OTHER NEUTROPENIA (HCC): ICD-10-CM

## 2019-08-22 DIAGNOSIS — L65.9 HAIR THINNING: ICD-10-CM

## 2019-08-22 LAB
A/G RATIO: 1.8 (ref 1.1–2.2)
ALBUMIN SERPL-MCNC: 4.8 G/DL (ref 3.4–5)
ALP BLD-CCNC: 149 U/L (ref 40–129)
ALT SERPL-CCNC: 16 U/L (ref 10–40)
ANION GAP SERPL CALCULATED.3IONS-SCNC: 14 MMOL/L (ref 3–16)
AST SERPL-CCNC: 20 U/L (ref 15–37)
BASOPHILS ABSOLUTE: 0 K/UL (ref 0–0.2)
BASOPHILS RELATIVE PERCENT: 0.9 %
BILIRUB SERPL-MCNC: 0.7 MG/DL (ref 0–1)
BUN BLDV-MCNC: 10 MG/DL (ref 7–20)
CALCIUM SERPL-MCNC: 10.2 MG/DL (ref 8.3–10.6)
CHLORIDE BLD-SCNC: 101 MMOL/L (ref 99–110)
CO2: 27 MMOL/L (ref 21–32)
CREAT SERPL-MCNC: 0.7 MG/DL (ref 0.6–1.2)
EOSINOPHILS ABSOLUTE: 0 K/UL (ref 0–0.6)
EOSINOPHILS RELATIVE PERCENT: 0.8 %
GFR AFRICAN AMERICAN: >60
GFR NON-AFRICAN AMERICAN: >60
GLOBULIN: 2.6 G/DL
GLUCOSE BLD-MCNC: 91 MG/DL (ref 70–99)
HCT VFR BLD CALC: 41.7 % (ref 36–48)
HEMOGLOBIN: 13.6 G/DL (ref 12–16)
LYMPHOCYTES ABSOLUTE: 1.3 K/UL (ref 1–5.1)
LYMPHOCYTES RELATIVE PERCENT: 48.3 %
MCH RBC QN AUTO: 29.4 PG (ref 26–34)
MCHC RBC AUTO-ENTMCNC: 32.6 G/DL (ref 31–36)
MCV RBC AUTO: 90 FL (ref 80–100)
MONOCYTES ABSOLUTE: 0.3 K/UL (ref 0–1.3)
MONOCYTES RELATIVE PERCENT: 11.3 %
NEUTROPHILS ABSOLUTE: 1 K/UL (ref 1.7–7.7)
NEUTROPHILS RELATIVE PERCENT: 38.7 %
PDW BLD-RTO: 13.4 % (ref 12.4–15.4)
PLATELET # BLD: 189 K/UL (ref 135–450)
PMV BLD AUTO: 8.6 FL (ref 5–10.5)
POTASSIUM SERPL-SCNC: 4.6 MMOL/L (ref 3.5–5.1)
RBC # BLD: 4.63 M/UL (ref 4–5.2)
SODIUM BLD-SCNC: 142 MMOL/L (ref 136–145)
TOTAL PROTEIN: 7.4 G/DL (ref 6.4–8.2)
TSH REFLEX FT4: 1.94 UIU/ML (ref 0.27–4.2)
VITAMIN B-12: 1525 PG/ML (ref 211–911)
WBC # BLD: 2.7 K/UL (ref 4–11)

## 2019-08-24 LAB — TESTOSTERONE TOTAL: 22 NG/DL (ref 20–70)

## 2019-08-26 LAB — DHEA: 0.6 NG/ML (ref 0.63–4.7)

## 2020-01-24 ENCOUNTER — OFFICE VISIT (OUTPATIENT)
Dept: PRIMARY CARE CLINIC | Age: 75
End: 2020-01-24
Payer: COMMERCIAL

## 2020-01-24 VITALS
HEIGHT: 67 IN | HEART RATE: 73 BPM | SYSTOLIC BLOOD PRESSURE: 110 MMHG | TEMPERATURE: 97.5 F | RESPIRATION RATE: 18 BRPM | DIASTOLIC BLOOD PRESSURE: 71 MMHG | BODY MASS INDEX: 25.11 KG/M2 | WEIGHT: 160 LBS | OXYGEN SATURATION: 98 %

## 2020-01-24 PROCEDURE — 99214 OFFICE O/P EST MOD 30 MIN: CPT | Performed by: INTERNAL MEDICINE

## 2020-01-24 ASSESSMENT — PATIENT HEALTH QUESTIONNAIRE - PHQ9
1. LITTLE INTEREST OR PLEASURE IN DOING THINGS: 0
SUM OF ALL RESPONSES TO PHQ QUESTIONS 1-9: 0
SUM OF ALL RESPONSES TO PHQ9 QUESTIONS 1 & 2: 0
SUM OF ALL RESPONSES TO PHQ QUESTIONS 1-9: 0
DEPRESSION UNABLE TO ASSESS: FUNCTIONAL CAPACITY MOTIVATION LIMITS ACCURACY
2. FEELING DOWN, DEPRESSED OR HOPELESS: 0

## 2020-01-24 NOTE — PROGRESS NOTES
CHOL 243 (H) 02/10/2017     Lab Results   Component Value Date    TRIG 47 05/22/2019    TRIG 36 05/09/2018    TRIG 42 02/10/2017     Lab Results   Component Value Date    HDL 82 (H) 05/22/2019    HDL 90 (H) 05/09/2018    HDL 83 (H) 02/10/2017     Lab Results   Component Value Date    LDLCHOLESTEROL 149 (H) 07/02/2011    LDLCALC 139 (H) 05/22/2019    LDLCALC 133 (H) 05/09/2018    LDLCALC 152 (H) 02/10/2017     Lab Results   Component Value Date    LABVLDL 9 05/22/2019    LABVLDL 7 05/09/2018    LABVLDL 8 02/10/2017     No results found for: CHOLHDLRATIO    Patient is not taking aspirin because of the large amount of bruising  Memmory problem.: She is here for evaluation and treatment of cognitive problems. She is accompanied by patient. Primary caregiver is patient. The family and the patient identify problems with changes in short term memory. Family and patient report problems with no problems. Family and patient are concerned about  Decrease in short term. , however, they are not concerned about no problems. . Medication administration: none. Functional Assessment:   Activities of Daily Living (ADLs):    She is independent in the following: ambulation, bathing and hygiene, feeding, continence, grooming, toileting and dressing  Requires assistance with the following: none. Instrumental Activities of Daily Living (IADLs):    She is independent in the following: all ADL's. Requires assistance with the following: no assistence. Review of Systems   Constitutional: Positive for fatigue. Negative for chills, diaphoresis and malaise/fatigue. HENT: Negative for congestion and sore throat. Eyes: Negative for blurred vision. Respiratory: Negative for cough and shortness of breath (other). Cardiovascular: Negative for chest pain, palpitations, orthopnea and PND. Gastrointestinal: Negative for change in bowel habit, nausea and vomiting.    Musculoskeletal: Negative for arthralgias, joint swelling, myalgias and neck pain. Skin: Negative for rash. Neurological: Positive for weakness. Negative for vertigo, numbness and headaches. Prior to Visit Medications    Medication Sig Taking? Authorizing Provider   irbesartan-hydrochlorothiazide (AVALIDE) 300-12.5 MG per tablet Take 1 tablet by mouth daily Yes Karl Liu MD   Omega-3 Fatty Acids (FISH OIL) 1000 MG CAPS Take 3,000 mg by mouth 3 times daily Yes Historical Provider, MD   Rc Grass POWD by Does not apply route. Yes Historical Provider, MD   Multiple Vitamin (MULTIVITAMIN PO) Take  by mouth 2 times daily. Yes Historical Provider, MD   traMADol (ULTRAM) 50 MG tablet Take  mg by mouth. Historical Provider, MD   cyanocobalamin ([facility] VITAMIN B12) 1000 MCG tablet Take by mouth  Historical Provider, MD        Social History     Tobacco Use    Smoking status: Former Smoker     Packs/day: 0.25     Years: 10.00     Pack years: 2.50     Last attempt to quit: 1977     Years since quittin.4    Smokeless tobacco: Never Used   Substance Use Topics    Alcohol use: No        Vitals:    20 1643   BP: 110/71   Site: Left Upper Arm   Position: Sitting   Cuff Size: Large Adult   Pulse: 73   Resp: 18   Temp: 97.5 °F (36.4 °C)   TempSrc: Oral   SpO2: 98%   Weight: 160 lb (72.6 kg)   Height: 5' 6.5\" (1.689 m)     Estimated body mass index is 25.44 kg/m² as calculated from the following:    Height as of this encounter: 5' 6.5\" (1.689 m). Weight as of this encounter: 160 lb (72.6 kg). Physical Exam  Vitals signs reviewed. Constitutional:       General: She is not in acute distress. Appearance: She is well-developed. She is not diaphoretic. HENT:      Head: Normocephalic and atraumatic. Right Ear: External ear normal.      Left Ear: External ear normal.      Mouth/Throat:      Pharynx: No oropharyngeal exudate. Eyes:      General: No scleral icterus. Right eye: No discharge. Left eye: No discharge. signs of dementia. Only complaint of short-term but is not getting lost no problems driving driving and not losing things at home. Spouses not complained at all. Vitamin B12    TSH WITH REFLEX TO FT4    CBC Auto Differential    BARRY    SEDIMENTATION RATE   4. TIA (transient ischemic attack) with left sided weakness that lasts a few minutes times two will get CTA of the head and neck to further evaluate patient is already taking 81 mg aspirin daily. It carotid atherosclerosis will treat according to severity and start statin therapy high intensity. CTA HEAD NECK W WO CONTRAST    XR CERVICAL SPINE (2-3 VIEWS)       An electronic signature was used to authenticate this note.     --Dorothea Renae MD on 1/24/2020 at 5:33 PM

## 2020-01-26 PROBLEM — R41.3 MEMORY LOSS: Status: ACTIVE | Noted: 2020-01-26

## 2020-01-26 PROBLEM — G45.9 TIA (TRANSIENT ISCHEMIC ATTACK): Status: ACTIVE | Noted: 2020-01-26

## 2020-01-26 PROBLEM — L65.9 ALOPECIA: Status: ACTIVE | Noted: 2020-01-26

## 2020-01-26 ASSESSMENT — ENCOUNTER SYMPTOMS
COUGH: 0
SWOLLEN GLANDS: 0
SHORTNESS OF BREATH: 0
ORTHOPNEA: 0
VISUAL CHANGE: 0
NAUSEA: 0
CHANGE IN BOWEL HABIT: 0
SORE THROAT: 0
VOMITING: 0
BLURRED VISION: 0

## 2020-01-30 ENCOUNTER — TELEPHONE (OUTPATIENT)
Dept: PRIMARY CARE CLINIC | Age: 75
End: 2020-01-30

## 2020-02-01 ENCOUNTER — HOSPITAL ENCOUNTER (OUTPATIENT)
Dept: CT IMAGING | Age: 75
Discharge: HOME | End: 2020-02-01
Payer: COMMERCIAL

## 2020-02-01 ENCOUNTER — HOSPITAL ENCOUNTER (OUTPATIENT)
Age: 75
Discharge: HOME | End: 2020-02-01
Payer: COMMERCIAL

## 2020-02-01 LAB
BUN BLDV-MCNC: 13 MG/DL (ref 7–20)
CREAT SERPL-MCNC: 0.7 MG/DL (ref 0.6–1.2)
GFR AFRICAN AMERICAN: >60
GFR NON-AFRICAN AMERICAN: >60
SEDIMENTATION RATE, ERYTHROCYTE: 14 MM/HR (ref 0–30)
TSH REFLEX FT4: 1.85 UIU/ML (ref 0.27–4.2)
VITAMIN B-12: 1617 PG/ML (ref 211–911)

## 2020-02-01 PROCEDURE — 82607 VITAMIN B-12: CPT

## 2020-02-01 PROCEDURE — 86038 ANTINUCLEAR ANTIBODIES: CPT

## 2020-02-01 PROCEDURE — 6360000004 HC RX CONTRAST MEDICATION: Performed by: INTERNAL MEDICINE

## 2020-02-01 PROCEDURE — 85025 COMPLETE CBC W/AUTO DIFF WBC: CPT

## 2020-02-01 PROCEDURE — 36415 COLL VENOUS BLD VENIPUNCTURE: CPT

## 2020-02-01 PROCEDURE — 70496 CT ANGIOGRAPHY HEAD: CPT

## 2020-02-01 PROCEDURE — 85652 RBC SED RATE AUTOMATED: CPT

## 2020-02-01 PROCEDURE — 82565 ASSAY OF CREATININE: CPT

## 2020-02-01 PROCEDURE — 84443 ASSAY THYROID STIM HORMONE: CPT

## 2020-02-01 PROCEDURE — 84520 ASSAY OF UREA NITROGEN: CPT

## 2020-02-01 RX ADMIN — IOPAMIDOL 75 ML: 755 INJECTION, SOLUTION INTRAVENOUS at 11:40

## 2020-02-02 LAB — ANTI-NUCLEAR ANTIBODY (ANA): NEGATIVE

## 2020-02-03 ENCOUNTER — TELEPHONE (OUTPATIENT)
Dept: PRIMARY CARE CLINIC | Age: 75
End: 2020-02-03

## 2020-02-03 LAB
BASOPHILS ABSOLUTE: 0 K/UL (ref 0–0.2)
BASOPHILS RELATIVE PERCENT: 1.3 %
EOSINOPHILS ABSOLUTE: 0 K/UL (ref 0–0.6)
EOSINOPHILS RELATIVE PERCENT: 1.7 %
HCT VFR BLD CALC: 40.1 % (ref 36–48)
HEMATOLOGY PATH CONSULT: NO
HEMOGLOBIN: 13.1 G/DL (ref 12–16)
LYMPHOCYTES ABSOLUTE: 1.4 K/UL (ref 1–5.1)
LYMPHOCYTES RELATIVE PERCENT: 56.1 %
MCH RBC QN AUTO: 29.6 PG (ref 26–34)
MCHC RBC AUTO-ENTMCNC: 32.7 G/DL (ref 31–36)
MCV RBC AUTO: 90.4 FL (ref 80–100)
MONOCYTES ABSOLUTE: 0.3 K/UL (ref 0–1.3)
MONOCYTES RELATIVE PERCENT: 10.9 %
NEUTROPHILS ABSOLUTE: 0.7 K/UL (ref 1.7–7.7)
NEUTROPHILS RELATIVE PERCENT: 30 %
PDW BLD-RTO: 13.1 % (ref 12.4–15.4)
PLATELET # BLD: 175 K/UL (ref 135–450)
PMV BLD AUTO: 8.5 FL (ref 5–10.5)
RBC # BLD: 4.44 M/UL (ref 4–5.2)
WBC # BLD: 2.4 K/UL (ref 4–11)

## 2020-02-06 ENCOUNTER — TELEPHONE (OUTPATIENT)
Dept: PRIMARY CARE CLINIC | Age: 75
End: 2020-02-06

## 2020-02-25 ENCOUNTER — TELEPHONE (OUTPATIENT)
Dept: PRIMARY CARE CLINIC | Age: 75
End: 2020-02-25

## 2020-02-25 NOTE — LETTER
[facility]  [address] [id number]  Phone: [phone]  Fax: [phone]    Jackeline Aragon MD        February 25, 2020     Kat Delacruz  [address] [place] [address]      Dear Cam Negron:    Below are the results from your recent visit:    Resulted Orders   BARRY   Result Value Ref Range    BARRY Negative Negative      Comment:      BARRY specimens are screened using multiplex bead immunoassay  methodology. All results reported as positive are further tested  by indirect fluorescent assay (IFA) using Hep-2 substrate with  an IgG-specific conjugate.  The BARRY screen is designed to detect  antibodies against dsDNA, SS-A (Ro), SS-B (La), Cross (Sm), SmRNP,  RNP, Scl-70, Karly-1, Centromere, Chromatin, and Ribosomal P.      Narrative    Performed at:  [place] [address] [address] [facility] [place] [address]   Phone [phone]   SEDIMENTATION RATE   Result Value Ref Range    Sed Rate 14 0 - 30 mm/Hr    Narrative    Performed at:  [facility]  [address] [facility] [address]   Phone [phone]   Vitamin B12   Result Value Ref Range    Vitamin B-12 1617 (H) 211 - 911 pg/mL    Narrative    Performed at:  [address] [facility] [address]   Phone [phone]   CBC Auto Differential   Result Value Ref Range    WBC 2.4 (L) 4.0 - 11.0 K/uL    RBC 4.44 4.00 - 5.20 M/uL    Hemoglobin 13.1 12.0 - 16.0 g/dL    Hematocrit 40.1 36.0 - 48.0 %    MCV 90.4 80.0 - 100.0 fL    MCH 29.6 26.0 - 34.0 pg    MCHC 32.7 31.0 - 36.0 g/dL    RDW 13.1 12.4 - 15.4 %    Platelets 863 248 - 377 K/uL    MPV 8.5 5.0 - 10.5 fL    Path Consult No       Comment:      Previously Sent for Review    Neutrophils % 30.0 %    Lymphocytes % 56.1 %    Monocytes % 10.9 %    Eosinophils % 1.7 %    Basophils % 1.3 %

## 2020-03-04 ENCOUNTER — OFFICE VISIT (OUTPATIENT)
Dept: DERMATOLOGY | Age: 75
End: 2020-03-04
Payer: COMMERCIAL

## 2020-03-04 PROBLEM — L66.8 CENTRAL CENTRIFUGAL SCARRING ALOPECIA: Status: ACTIVE | Noted: 2020-03-04

## 2020-03-04 PROBLEM — L66.9 CENTRAL CENTRIFUGAL SCARRING ALOPECIA: Status: ACTIVE | Noted: 2020-03-04

## 2020-03-04 PROCEDURE — G8427 DOCREV CUR MEDS BY ELIG CLIN: HCPCS | Performed by: DERMATOLOGY

## 2020-03-04 PROCEDURE — 1090F PRES/ABSN URINE INCON ASSESS: CPT | Performed by: DERMATOLOGY

## 2020-03-04 PROCEDURE — 1036F TOBACCO NON-USER: CPT | Performed by: DERMATOLOGY

## 2020-03-04 PROCEDURE — G8417 CALC BMI ABV UP PARAM F/U: HCPCS | Performed by: DERMATOLOGY

## 2020-03-04 PROCEDURE — 4040F PNEUMOC VAC/ADMIN/RCVD: CPT | Performed by: DERMATOLOGY

## 2020-03-04 PROCEDURE — 3017F COLORECTAL CA SCREEN DOC REV: CPT | Performed by: DERMATOLOGY

## 2020-03-04 PROCEDURE — 1123F ACP DISCUSS/DSCN MKR DOCD: CPT | Performed by: DERMATOLOGY

## 2020-03-04 PROCEDURE — G8484 FLU IMMUNIZE NO ADMIN: HCPCS | Performed by: DERMATOLOGY

## 2020-03-04 PROCEDURE — G8399 PT W/DXA RESULTS DOCUMENT: HCPCS | Performed by: DERMATOLOGY

## 2020-03-04 PROCEDURE — 99203 OFFICE O/P NEW LOW 30 MIN: CPT | Performed by: DERMATOLOGY

## 2020-03-04 RX ORDER — FLUOCINONIDE 0.5 MG/G
OINTMENT TOPICAL
Qty: 60 G | Refills: 3 | Status: SHIPPED | OUTPATIENT
Start: 2020-03-04 | End: 2020-03-11

## 2020-03-04 NOTE — PATIENT INSTRUCTIONS
Rogaine Instructions    1. Females: start rogaine (minoxidil) 5% solution or foam ONCE DAILY to affected areas of scalp hair loss. This is over the counter at any drug store. Side effects include: hypertrichosis (unwanted hair growth elsewhere), headache, irritation, itch      2.  Also look for a deep conditioning treatment for ends of hair with either a  OR the pantene pro-v gold line

## 2020-03-04 NOTE — PROGRESS NOTES
[facility], [facility]) Physicians    Previous clinic visit:none     CC:  hair loss    HPI:    1.)  Here today with many year h/o progressive hair loss confined to crown of scalp. Has many year h/o chemical and heat styling hair care practices; has been natural now for a few years. No tx to date. Has some associated itch and hair end breakage. DERM HISTORY:   Personal history of NMSC or MM- no    Family history of NMSC or MM- no   Sunburns easily- No   Uses sunscreen- No  History of tanning bed use- No    ADDITIONAL HISTORY:    I have reviewed past medical and surgical histories, current medications, allergies, social and family histories as documented in the patient's electronic medical record. Current Outpatient Medications   Medication Sig Dispense Refill    traMADol (ULTRAM) 50 MG tablet Take  mg by mouth.  irbesartan-hydrochlorothiazide (AVALIDE) 300-12.5 MG per tablet Take 1 tablet by mouth daily 90 tablet 3    Omega-3 Fatty Acids (FISH OIL) 1000 MG CAPS Take 3,000 mg by mouth 3 times daily      Barley Grass POWD by Does not apply route.  Multiple Vitamin (MULTIVITAMIN PO) Take  by mouth 2 times daily. No current facility-administered medications for this visit.         ROS:    General: No fevers, chills, sweats, unexplained weight loss or weight gain, fatigue, malaise   Skin: Denies additional lesions    Heme: No history of bleeding diatheses    Allergy: Denies seasonal or environmental allergies or other medication allergies     PHYSICAL EXAM:    General: Well-appearing, NAD    Neurologic/psychiatric: Patient is AOx3; mood and affect are appropriate and congruent  Eyes: conjunctivae and eyelids without erythema, injection, or discharge   Integument: Examination was performed of the following and were unremarkable except as otherwise noted below:scalp, hair, face, ears, mucosa of gums/teeth/cutaneous and mucosal lips, buccal mucosa, oropharynx, neck, breast/axilla/chest, abdomen, groin, back, buttocks, RUE, LUE, RLE, LLE, digits/nails, apocrine/eccrine glands; genital exam deferred per pt request    Abnormalities noted include:    1.) Crown of scalp with approx 60% reduction in hair density with about 30% preservation of follicular ostia with mild periostial erythema      ASSESSMENT AND PLAN:    1.)  CCCA, mod severe:  - Start lidex 0.05% oint bid to inflamed areas  - Start rogaine 5% soln or foam once daily to scalp; edu: irritation, hypertrichosis, hypotension  - Deep hair conditioning tx recommended     Return to Clinic:  6 mo  Discussed plan with patient and/or primary caretaker. Patient to call clinic with any questions / concerns. Reviewed side effects of treatment(s) and/or medication(s) with patient and/or primary caretaker.    AVS provided or is available on [facility] [facility]   ____________________________________________________________________________   Faustina Saldana MD, MPH, FAAD  [facility], [facility]

## 2020-03-04 NOTE — LETTER
[facility]  [address] [place] [address]  Phone: [phone]  Fax: [phone]    Shankar Rivas MD        March 4, 2020     Mohsen Faye [facility] [address]    Patient: Gary Farooq  MR Number: [id number]  YOB: 1945  Date of Visit: 3/4/2020    Dear Dr. Mohsen Faye:    Thank you for the request for consultation for Camelia Headley to me for the evaluation of hair loss. Below are the relevant portions of my assessment and plan of care. If you have questions, please do not hesitate to call me. I look forward to following Fabiola Zavala along with you.     Sincerely,        Shankar Rivas MD

## 2020-08-14 RX ORDER — IRBESARTAN AND HYDROCHLOROTHIAZIDE 300; 12.5 MG/1; MG/1
TABLET, FILM COATED ORAL
Qty: 90 TABLET | Refills: 2 | Status: SHIPPED | OUTPATIENT
Start: 2020-08-14

## 2024-08-23 ENCOUNTER — HOSPITAL ENCOUNTER (EMERGENCY)
Age: 79
Discharge: HOME | End: 2024-08-24
Payer: OTHER MISCELLANEOUS

## 2024-08-23 ENCOUNTER — APPOINTMENT (OUTPATIENT)
Dept: CT IMAGING | Age: 79
End: 2024-08-23
Payer: OTHER MISCELLANEOUS

## 2024-08-23 VITALS
RESPIRATION RATE: 18 BRPM | BODY MASS INDEX: 27.64 KG/M2 | DIASTOLIC BLOOD PRESSURE: 67 MMHG | HEART RATE: 54 BPM | OXYGEN SATURATION: 100 % | SYSTOLIC BLOOD PRESSURE: 115 MMHG | WEIGHT: 172 LBS | TEMPERATURE: 97.8 F | HEIGHT: 66 IN
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
## 2024-08-23 DIAGNOSIS — S39.012A STRAIN OF LUMBAR REGION, INITIAL ENCOUNTER: ICD-10-CM

## 2024-08-23 PROCEDURE — 72131 CT LUMBAR SPINE W/O DYE: CPT

## 2024-08-23 PROCEDURE — 6370000000 HC RX 637 (ALT 250 FOR IP)

## 2024-08-23 PROCEDURE — 6370000000 HC RX 637 (ALT 250 FOR IP): Performed by: PHYSICIAN ASSISTANT

## 2024-08-23 PROCEDURE — 99284 EMERGENCY DEPT VISIT MOD MDM: CPT

## 2024-08-23 PROCEDURE — 72125 CT NECK SPINE W/O DYE: CPT

## 2024-08-23 PROCEDURE — 70450 CT HEAD/BRAIN W/O DYE: CPT

## 2024-08-23 RX ORDER — LIDOCAINE 4 G/G
1 PATCH TOPICAL ONCE
Status: DISCONTINUED | OUTPATIENT
Start: 2024-08-23 | End: 2024-08-24 | Stop reason: HOSPADM

## 2024-08-23 RX ORDER — ACETAMINOPHEN 500 MG
1000 TABLET ORAL ONCE
Status: COMPLETED | OUTPATIENT
Start: 2024-08-23 | End: 2024-08-23

## 2024-08-23 RX ORDER — METHOCARBAMOL 500 MG/1
500 TABLET, FILM COATED ORAL ONCE
Status: DISCONTINUED | OUTPATIENT
Start: 2024-08-23 | End: 2024-08-24 | Stop reason: HOSPADM

## 2024-08-23 RX ORDER — LIDOCAINE 4 G/G
PATCH TOPICAL
Status: DISCONTINUED
Start: 2024-08-23 | End: 2024-08-24 | Stop reason: HOSPADM

## 2024-08-23 RX ORDER — LIDOCAINE 4 G/G
1 PATCH TOPICAL DAILY
Status: DISCONTINUED | OUTPATIENT
Start: 2024-08-24 | End: 2024-08-23

## 2024-08-23 RX ADMIN — ACETAMINOPHEN 1000 MG: 500 TABLET ORAL at 22:51

## 2024-08-23 ASSESSMENT — LIFESTYLE VARIABLES
HOW OFTEN DO YOU HAVE A DRINK CONTAINING ALCOHOL: NEVER
HOW MANY STANDARD DRINKS CONTAINING ALCOHOL DO YOU HAVE ON A TYPICAL DAY: PATIENT DOES NOT DRINK

## 2024-08-23 ASSESSMENT — PAIN SCALES - GENERAL: PAINLEVEL_OUTOF10: 4

## 2024-08-24 RX ORDER — LIDOCAINE 50 MG/G
1 PATCH TOPICAL DAILY
Qty: 10 PATCH | Refills: 0 | Status: SHIPPED | OUTPATIENT
Start: 2024-08-24 | End: 2024-09-03

## 2024-08-24 RX ORDER — METHOCARBAMOL 500 MG/1
500 TABLET, FILM COATED ORAL 3 TIMES DAILY PRN
Qty: 30 TABLET | Refills: 0 | Status: SHIPPED | OUTPATIENT
Start: 2024-08-24 | End: 2024-09-03

## 2024-08-24 ASSESSMENT — ENCOUNTER SYMPTOMS
VOMITING: 0
SHORTNESS OF BREATH: 0
RHINORRHEA: 0
NAUSEA: 0
COUGH: 0
DIARRHEA: 0
ABDOMINAL PAIN: 0
BACK PAIN: 1
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
